# Patient Record
Sex: FEMALE | Race: WHITE | NOT HISPANIC OR LATINO | URBAN - METROPOLITAN AREA
[De-identification: names, ages, dates, MRNs, and addresses within clinical notes are randomized per-mention and may not be internally consistent; named-entity substitution may affect disease eponyms.]

---

## 2018-07-31 ENCOUNTER — IMPORTED ENCOUNTER (OUTPATIENT)
Dept: URBAN - METROPOLITAN AREA CLINIC 9 | Facility: CLINIC | Age: 18
End: 2018-07-31

## 2019-07-15 ENCOUNTER — IMPORTED ENCOUNTER (OUTPATIENT)
Dept: URBAN - METROPOLITAN AREA CLINIC 9 | Facility: CLINIC | Age: 19
End: 2019-07-15

## 2020-06-22 NOTE — PATIENT DISCUSSION
New Prescription: ketorolac (ketorolac): drops: 0.5% 1 drop four times a day as directed into affected eye 06-

## 2020-06-22 NOTE — PATIENT DISCUSSION
New Prescription: prednisolone acetate (prednisolone acetate): drops,suspension: 1% 1 drop four times a day as directed into affected eye 06-

## 2020-06-22 NOTE — PATIENT DISCUSSION
New Prescription: ofloxacin (ofloxacin): drops: 0.3% 1 drop four times a day as directed into affected eye 06-

## 2020-06-22 NOTE — PATIENT DISCUSSION
CATARACT, OU - EQUALLY VISUALLY SIGNIFICANT . SCHEDULE SX OD (PATIENT FEELS OD IS WORSE) THEN LATER IN OS IF VISUAL SYMPTOMS PERSIST.

## 2020-06-22 NOTE — PATIENT DISCUSSION
Surgery Counseling: I have discussed the option of glasses versus cataract surgery versus following. It was explained that when the patients vision no longer meets their visual needs and a glasses prescription does not improve visual symptoms, the option of cataract surgery is a reasonable next step. It was explained that there is no guarantee that removing the cataract will improve their visual symptoms, however; it is believed that the cataract is contributing to the patient's visual impairment and surgery may improve both the visual and functional status of the patient. The risks, benefits and alternatives of surgery were discussed with the patient. After this discussion, the patient desires to proceed with cataract surgery with implantation of an intraocular lens to improve vision and reduce glare at night.

## 2021-10-01 ASSESSMENT — VISUAL ACUITY
OD_CC: 20/20 SN
OD_CC: 20/20 SN
OS_CC: 20/20 SN
OS_CC: 20/20 SN

## 2021-10-01 ASSESSMENT — KERATOMETRY
OD_K1POWER_DIOPTERS: 42
OD_K1POWER_DIOPTERS: 42.75
OS_AXISANGLE_DEGREES: 7
OD_AXISANGLE_DEGREES: 175
OD_AXISANGLE_DEGREES: 173
OS_K1POWER_DIOPTERS: 42.5
OS_AXISANGLE2_DEGREES: 91
OD_K2POWER_DIOPTERS: 43.5
OD_AXISANGLE2_DEGREES: 83
OS_AXISANGLE2_DEGREES: 97
OS_K2POWER_DIOPTERS: 43.75
OS_AXISANGLE_DEGREES: 1
OS_K1POWER_DIOPTERS: 43
OD_AXISANGLE2_DEGREES: 85
OD_K2POWER_DIOPTERS: 44.625
OS_K2POWER_DIOPTERS: 43

## 2021-10-01 ASSESSMENT — TONOMETRY
OS_IOP_MMHG: 14
OD_IOP_MMHG: 14
OS_IOP_MMHG: 14
OD_IOP_MMHG: 14

## 2021-10-07 ENCOUNTER — ESTABLISHED PATIENT (OUTPATIENT)
Dept: URBAN - METROPOLITAN AREA CLINIC 4 | Facility: CLINIC | Age: 21
End: 2021-10-07

## 2021-10-07 DIAGNOSIS — H11.153: ICD-10-CM

## 2021-10-07 DIAGNOSIS — H52.13: ICD-10-CM

## 2021-10-07 DIAGNOSIS — H04.123: ICD-10-CM

## 2021-10-07 PROCEDURE — 92014 COMPRE OPH EXAM EST PT 1/>: CPT

## 2021-10-07 ASSESSMENT — TONOMETRY
OS_IOP_MMHG: 10
OD_IOP_MMHG: 10

## 2022-06-30 RX ORDER — NITROFURANTOIN 25; 75 MG/1; MG/1
CAPSULE ORAL
COMMUNITY

## 2022-06-30 RX ORDER — NORETHINDRONE ACETATE AND ETHINYL ESTRADIOL .03; 1.5 MG/1; MG/1
TABLET ORAL
COMMUNITY

## 2023-05-03 ENCOUNTER — ESTABLISHED PATIENT (OUTPATIENT)
Dept: URBAN - METROPOLITAN AREA CLINIC 4 | Facility: CLINIC | Age: 23
End: 2023-05-03

## 2023-05-03 DIAGNOSIS — H11.153: ICD-10-CM

## 2023-05-03 DIAGNOSIS — H52.13: ICD-10-CM

## 2023-05-03 DIAGNOSIS — H04.123: ICD-10-CM

## 2023-05-03 PROCEDURE — 92015 DETERMINE REFRACTIVE STATE: CPT

## 2023-05-03 PROCEDURE — 92014 COMPRE OPH EXAM EST PT 1/>: CPT

## 2023-05-03 PROCEDURE — 92310A CONTACT LENS 50

## 2023-05-03 ASSESSMENT — TONOMETRY
OD_IOP_MMHG: 15
OS_IOP_MMHG: 15

## 2025-04-14 ENCOUNTER — COMPREHENSIVE EXAM (OUTPATIENT)
Age: 25
End: 2025-04-14

## 2025-04-14 DIAGNOSIS — H52.13: ICD-10-CM

## 2025-04-14 DIAGNOSIS — H11.153: ICD-10-CM

## 2025-04-14 DIAGNOSIS — H04.123: ICD-10-CM

## 2025-04-14 PROCEDURE — 92014 COMPRE OPH EXAM EST PT 1/>: CPT | Mod: PW

## 2025-04-14 PROCEDURE — 92015 DETERMINE REFRACTIVE STATE: CPT | Mod: PW

## 2025-04-14 PROCEDURE — 92310-1 LEVEL 1 SOFT LENS UPDATE: Mod: PW
